# Patient Record
Sex: FEMALE | Race: BLACK OR AFRICAN AMERICAN | URBAN - NONMETROPOLITAN AREA
[De-identification: names, ages, dates, MRNs, and addresses within clinical notes are randomized per-mention and may not be internally consistent; named-entity substitution may affect disease eponyms.]

---

## 2018-07-30 ENCOUNTER — APPOINTMENT (RX ONLY)
Dept: URBAN - NONMETROPOLITAN AREA CLINIC 27 | Facility: CLINIC | Age: 28
Setting detail: DERMATOLOGY
End: 2018-07-30

## 2018-07-30 VITALS — SYSTOLIC BLOOD PRESSURE: 112 MMHG | WEIGHT: 119 LBS | DIASTOLIC BLOOD PRESSURE: 75 MMHG | HEART RATE: 93 BPM

## 2018-07-30 DIAGNOSIS — L93.0 DISCOID LUPUS ERYTHEMATOSUS: ICD-10-CM | Status: INADEQUATELY CONTROLLED

## 2018-07-30 PROBLEM — L30.9 DERMATITIS, UNSPECIFIED: Status: ACTIVE | Noted: 2018-07-30

## 2018-07-30 PROBLEM — L29.8 OTHER PRURITUS: Status: ACTIVE | Noted: 2018-07-30

## 2018-07-30 PROCEDURE — ? ORDER TESTS

## 2018-07-30 PROCEDURE — ? PRESCRIPTION

## 2018-07-30 PROCEDURE — 99202 OFFICE O/P NEW SF 15 MIN: CPT

## 2018-07-30 PROCEDURE — ? COUNSELING

## 2018-07-30 RX ORDER — KETOCONAZOLE 20 MG/G
CREAM TOPICAL
Qty: 1 | Refills: 2 | Status: ERX | COMMUNITY
Start: 2018-07-30

## 2018-07-30 RX ORDER — FLUOCINOLONE ACETONIDE 0.11 MG/ML
OIL TOPICAL
Qty: 1 | Refills: 2 | Status: ERX | COMMUNITY
Start: 2018-07-30

## 2018-07-30 RX ADMIN — KETOCONAZOLE: 20 CREAM TOPICAL at 00:00

## 2018-07-30 RX ADMIN — FLUOCINOLONE ACETONIDE: 0.11 OIL TOPICAL at 00:00

## 2018-07-30 ASSESSMENT — LOCATION SIMPLE DESCRIPTION DERM
LOCATION SIMPLE: LEFT UPPER ARM
LOCATION SIMPLE: LEFT CHEEK
LOCATION SIMPLE: RIGHT CHEEK
LOCATION SIMPLE: LEFT EYEBROW
LOCATION SIMPLE: RIGHT SCALP
LOCATION SIMPLE: RIGHT EYEBROW

## 2018-07-30 ASSESSMENT — LOCATION DETAILED DESCRIPTION DERM
LOCATION DETAILED: LEFT MEDIAL MALAR CHEEK
LOCATION DETAILED: RIGHT CENTRAL EYEBROW
LOCATION DETAILED: RIGHT MEDIAL FRONTAL SCALP
LOCATION DETAILED: LEFT CENTRAL EYEBROW
LOCATION DETAILED: RIGHT MEDIAL MALAR CHEEK
LOCATION DETAILED: LEFT ANTECUBITAL SKIN
LOCATION DETAILED: LEFT INFERIOR NASAL CHEEK

## 2018-07-30 ASSESSMENT — LOCATION ZONE DERM
LOCATION ZONE: FACE
LOCATION ZONE: ARM
LOCATION ZONE: SCALP

## 2018-07-30 ASSESSMENT — SEVERITY ASSESSMENT: SEVERITY: MILD

## 2018-07-30 NOTE — PROCEDURE: ORDER TESTS
Performing Laboratory: [id number]
Billing Type: Third-Party Bill
Expected Date Of Service: 07/30/2018
Bill For Surgical Tray: no

## 2018-08-20 ENCOUNTER — APPOINTMENT (RX ONLY)
Dept: URBAN - NONMETROPOLITAN AREA CLINIC 27 | Facility: CLINIC | Age: 28
Setting detail: DERMATOLOGY
End: 2018-08-20

## 2018-08-20 VITALS — WEIGHT: 119 LBS | SYSTOLIC BLOOD PRESSURE: 112 MMHG | DIASTOLIC BLOOD PRESSURE: 68 MMHG | HEART RATE: 93 BPM

## 2018-08-20 DIAGNOSIS — L30.9 DERMATITIS, UNSPECIFIED: ICD-10-CM | Status: RESOLVED

## 2018-08-20 PROCEDURE — ? TREATMENT REGIMEN

## 2018-08-20 PROCEDURE — 99212 OFFICE O/P EST SF 10 MIN: CPT

## 2018-08-20 PROCEDURE — ? COUNSELING

## 2018-08-20 ASSESSMENT — SEVERITY ASSESSMENT: SEVERITY: MILD

## 2019-03-15 ENCOUNTER — RX ONLY (OUTPATIENT)
Age: 29
Setting detail: RX ONLY
End: 2019-03-15

## 2019-03-15 ENCOUNTER — APPOINTMENT (RX ONLY)
Dept: URBAN - NONMETROPOLITAN AREA CLINIC 27 | Facility: CLINIC | Age: 29
Setting detail: DERMATOLOGY
End: 2019-03-15

## 2019-03-15 DIAGNOSIS — L21.8 OTHER SEBORRHEIC DERMATITIS: ICD-10-CM | Status: IMPROVED

## 2019-03-15 PROCEDURE — ? TREATMENT REGIMEN

## 2019-03-15 PROCEDURE — ? COUNSELING

## 2019-03-15 PROCEDURE — 99212 OFFICE O/P EST SF 10 MIN: CPT

## 2019-03-15 RX ORDER — FLUOCINOLONE ACETONIDE 0.11 MG/ML
OIL TOPICAL
Qty: 1 | Refills: 5 | Status: ERX

## 2019-03-15 ASSESSMENT — LOCATION SIMPLE DESCRIPTION DERM
LOCATION SIMPLE: ANTERIOR SCALP
LOCATION SIMPLE: LEFT CHEEK

## 2019-03-15 ASSESSMENT — LOCATION ZONE DERM
LOCATION ZONE: FACE
LOCATION ZONE: SCALP

## 2019-03-15 ASSESSMENT — LOCATION DETAILED DESCRIPTION DERM
LOCATION DETAILED: MID-FRONTAL SCALP
LOCATION DETAILED: LEFT INFERIOR MEDIAL MALAR CHEEK

## 2019-03-15 NOTE — PROCEDURE: TREATMENT REGIMEN
Continue Regimen: Ketoconazole cream, apply to face twice weekly \\nDerma-smoothe scalp oil, apply to scalp once weekly \\nRefills sent today for Derma-Smoothe scalp oil
Detail Level: Detailed

## 2020-03-05 ENCOUNTER — APPOINTMENT (RX ONLY)
Dept: URBAN - NONMETROPOLITAN AREA CLINIC 27 | Facility: CLINIC | Age: 30
Setting detail: DERMATOLOGY
End: 2020-03-05

## 2020-03-05 VITALS — WEIGHT: 131 LBS

## 2020-03-05 DIAGNOSIS — L21.8 OTHER SEBORRHEIC DERMATITIS: ICD-10-CM | Status: STABLE

## 2020-03-05 PROCEDURE — ? TREATMENT REGIMEN

## 2020-03-05 PROCEDURE — ? PRESCRIPTION

## 2020-03-05 PROCEDURE — ? COUNSELING

## 2020-03-05 PROCEDURE — 99213 OFFICE O/P EST LOW 20 MIN: CPT

## 2020-03-05 RX ORDER — KETOCONAZOLE 20 MG/ML
SHAMPOO TOPICAL
Qty: 1 | Refills: 12 | Status: ERX | COMMUNITY
Start: 2020-03-05

## 2020-03-05 RX ORDER — KETOCONAZOLE 20 MG/G
CREAM TOPICAL
Qty: 1 | Refills: 12 | Status: ERX

## 2020-03-05 RX ADMIN — KETOCONAZOLE: 20 SHAMPOO TOPICAL at 00:00

## 2020-03-05 ASSESSMENT — LOCATION SIMPLE DESCRIPTION DERM
LOCATION SIMPLE: GLABELLA
LOCATION SIMPLE: LEFT CHEEK
LOCATION SIMPLE: LEFT FOREHEAD
LOCATION SIMPLE: RIGHT CHEEK
LOCATION SIMPLE: RIGHT FOREHEAD

## 2020-03-05 ASSESSMENT — LOCATION ZONE DERM: LOCATION ZONE: FACE

## 2020-03-05 ASSESSMENT — LOCATION DETAILED DESCRIPTION DERM
LOCATION DETAILED: LEFT SUPERIOR FOREHEAD
LOCATION DETAILED: RIGHT INFERIOR MEDIAL MALAR CHEEK
LOCATION DETAILED: RIGHT SUPERIOR FOREHEAD
LOCATION DETAILED: GLABELLA
LOCATION DETAILED: LEFT INFERIOR MEDIAL MALAR CHEEK

## 2020-03-05 NOTE — PROCEDURE: TREATMENT REGIMEN
Initiate Treatment: Apply Ketoconazole shampoo to affected areas on scalp once daily as needed for flares
Modify Regimen: Increase application of Ketoconazole cream on face to once every other day
Detail Level: Detailed

## 2022-10-20 ENCOUNTER — APPOINTMENT (RX ONLY)
Dept: URBAN - NONMETROPOLITAN AREA CLINIC 25 | Facility: CLINIC | Age: 32
Setting detail: DERMATOLOGY
End: 2022-10-20

## 2022-10-25 ENCOUNTER — APPOINTMENT (RX ONLY)
Dept: URBAN - NONMETROPOLITAN AREA CLINIC 25 | Facility: CLINIC | Age: 32
Setting detail: DERMATOLOGY
End: 2022-10-25

## 2022-10-25 DIAGNOSIS — Z00.8 ENCOUNTER FOR OTHER GENERAL EXAMINATION: ICD-10-CM

## 2022-10-25 DIAGNOSIS — L21.8 OTHER SEBORRHEIC DERMATITIS: ICD-10-CM | Status: INADEQUATELY CONTROLLED

## 2022-10-25 PROCEDURE — ? PRESCRIPTION

## 2022-10-25 PROCEDURE — ? COUNSELING

## 2022-10-25 PROCEDURE — ? TREATMENT REGIMEN

## 2022-10-25 PROCEDURE — ? OTHER

## 2022-10-25 PROCEDURE — 99213 OFFICE O/P EST LOW 20 MIN: CPT

## 2022-10-25 RX ORDER — KETOCONAZOLE 20 MG/G
CREAM TOPICAL
Qty: 60 | Refills: 3 | Status: ERX | COMMUNITY
Start: 2022-10-25

## 2022-10-25 RX ADMIN — KETOCONAZOLE: 20 CREAM TOPICAL at 00:00

## 2022-10-25 ASSESSMENT — LOCATION SIMPLE DESCRIPTION DERM
LOCATION SIMPLE: LEFT CHEEK
LOCATION SIMPLE: INFERIOR FOREHEAD

## 2022-10-25 ASSESSMENT — SEVERITY ASSESSMENT: HOW SEVERE IS THIS PATIENT'S CONDITION?: MODERATE

## 2022-10-25 ASSESSMENT — LOCATION DETAILED DESCRIPTION DERM
LOCATION DETAILED: LEFT CENTRAL MALAR CHEEK
LOCATION DETAILED: INFERIOR MID FOREHEAD

## 2022-10-25 ASSESSMENT — LOCATION ZONE DERM: LOCATION ZONE: FACE

## 2022-10-25 NOTE — PROCEDURE: TREATMENT REGIMEN
Detail Level: Detailed
Initiate Treatment: Ketoconazole cream- apply daily at night x7 days then decrease to twice weekly for maintenance.

## 2023-10-27 ENCOUNTER — RX ONLY (OUTPATIENT)
Age: 33
Setting detail: RX ONLY
End: 2023-10-27

## 2023-10-27 RX ORDER — KETOCONAZOLE 20 MG/G
CREAM TOPICAL
Qty: 30 | Refills: 0 | Status: ERX